# Patient Record
Sex: MALE | Race: BLACK OR AFRICAN AMERICAN | URBAN - METROPOLITAN AREA
[De-identification: names, ages, dates, MRNs, and addresses within clinical notes are randomized per-mention and may not be internally consistent; named-entity substitution may affect disease eponyms.]

---
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
## 2025-01-07 ENCOUNTER — ANCILLARY PROCEDURE (OUTPATIENT)
Dept: GENERAL RADIOLOGY | Facility: CLINIC | Age: 25
End: 2025-01-07
Attending: STUDENT IN AN ORGANIZED HEALTH CARE EDUCATION/TRAINING PROGRAM

## 2025-01-07 ENCOUNTER — OFFICE VISIT (OUTPATIENT)
Dept: URGENT CARE | Facility: URGENT CARE | Age: 25
End: 2025-01-07

## 2025-01-07 VITALS
WEIGHT: 265 LBS | OXYGEN SATURATION: 99 % | HEART RATE: 56 BPM | TEMPERATURE: 97.2 F | SYSTOLIC BLOOD PRESSURE: 130 MMHG | BODY MASS INDEX: 33.12 KG/M2 | DIASTOLIC BLOOD PRESSURE: 69 MMHG | RESPIRATION RATE: 17 BRPM

## 2025-01-07 DIAGNOSIS — M94.0 COSTOCHONDRITIS: ICD-10-CM

## 2025-01-07 DIAGNOSIS — R07.81 RIB PAIN: Primary | ICD-10-CM

## 2025-01-07 DIAGNOSIS — S29.8XXD BLUNT TRAUMA OF RIB, SUBSEQUENT ENCOUNTER: ICD-10-CM

## 2025-01-07 DIAGNOSIS — R07.81 RIB PAIN: ICD-10-CM

## 2025-01-07 PROCEDURE — 99203 OFFICE O/P NEW LOW 30 MIN: CPT | Performed by: STUDENT IN AN ORGANIZED HEALTH CARE EDUCATION/TRAINING PROGRAM

## 2025-01-07 PROCEDURE — 71101 X-RAY EXAM UNILAT RIBS/CHEST: CPT | Mod: TC | Performed by: RADIOLOGY

## 2025-01-07 RX ORDER — LIDOCAINE 50 MG/G
1 PATCH TOPICAL EVERY 24 HOURS
Qty: 10 PATCH | Refills: 0 | Status: SHIPPED | OUTPATIENT
Start: 2025-01-07 | End: 2025-01-07

## 2025-01-07 NOTE — PROGRESS NOTES
chief complaint; right rib pain    HPI:  Amrik S Bing is a 24 year old male who presents today complaining of right-sided lower rib pain.  Has been ongoing since December 23rd 2024.  Patient plays football, started noticing pain after he had attempted to tackle his opponent, unsure of how he got hit but has been having right-sided lower rib pain since then.  Pain is mainly intermittent, noticed with breathing and movement.  Initial x-ray was obtained in [place] after the game, was unremarkable at that time.  However, pain has persisted since then    History obtained from the patient.    Problem List:  There are no relevant problems documented for this patient.      No past medical history on file.    Social History     Tobacco Use    Smoking status: Never    Smokeless tobacco: Not on file   Substance Use Topics    Alcohol use: No       Review of systems  ROS negative except for pertinent positives listed in HPI      Vitals:    01/07/25 1306   BP: 130/69   Pulse: 56   Resp: 17   Temp: 97.2  F (36.2  C)   TempSrc: Tympanic   SpO2: 99%   Weight: 120.2 kg (265 lb)       Physical Exam  Constitutional: healthy, alert, and no distress  Head: Normocephalic.   Neck: Neck supple.  Respiratory:unlabored respiratory effort  Musculoskeletal: Tenderness elicited with palpation of the right lower costochondral and costal cartilage on the eighth, ninth and 10th rib area.  Extremities normal- no gross deformities noted, gait normal  Psychiatric: mentation appears normal and affect normal/bright      Assessment & Plan   Amrik was seen today for urgent care.    Diagnoses and all orders for this visit:    Rib pain  Costochondritis  Suspect the pain is likely from costochondritis, could also be from muscle strain.  Either way this is musculoskeletal in nature, recommended pain control with anti-inflammatory medications.  Patient has ibuprofen at home, recommended scheduling 800 mg 3 times a day with meals for 2 to 3 days straight and  then continuing with as needed, offered lidocaine patch, diclofenac gel and physical therapy, patient and mom declined tx and therapy.  -     Discontinue: lidocaine (LIDODERM) 5 % patch; Place 1 patch over 12 hours onto the skin every 24 hours. To prevent lidocaine toxicity, patient should be patch free for 12 hrs daily.  -     Cancel: Physical Therapy  Referral; Future  -     Discontinue: diclofenac (VOLTAREN) 1 % topical gel; Apply 2 g topically 4 times daily.    Blunt trauma of rib, subsequent encounter  Chest x-ray unremarkable, no suspicious for rib fracture at this point.  -     XR Ribs & Chest Left G/E 3 Views; Future      At the end of the encounter, I discussed results, diagnosis, medications. Discussed red flags for immediate return to clinic/ER, as well as indications for follow up if no improvement. Patient understood and agreed to plan. Patient was stable for discharge.